# Patient Record
(demographics unavailable — no encounter records)

---

## 2025-06-02 NOTE — DISCUSSION/SUMMARY
[FreeTextEntry1] : Well appearing female is here for gyn exam; reports regular periods, has one male partner stopped using condoms consistently due to irritation during and after sex, recommend polyurethane condoms instead of latex and use of water-based lubrication as well. She declines BCM options at this time, also reviewed Phexxi, pt declines at this time.  Reviewed abnormal PAPs/HPV, recommend patient find out if she has had HPV vaccine, recommend Gardasil if she has not had it.  - PAP done with NG/GC - Rx for STI labs provided (pt has not had done in the past) - Healthy diet and routine exercise recommended - SBE advised - Safe sex/condom use/ barrier methods recommended 100%  RTO in 1 year for routine GYN exam and PRN

## 2025-06-02 NOTE — HISTORY OF PRESENT ILLNESS
[N] : Patient denies prior pregnancies [Currently Active] : currently active [Men] : men [Vaginal] : vaginal [Y] : Patient uses contraception [Condoms] : uses condoms [Monogamous (Male Partner)] : is monogamous with a male partner [FreeTextEntry1] : 27-year-old G0 Female is here as a new patient; previously seen at [facility] (Dr. Velez). Reports regular monthly periods. Sexually active with one male partner, uses withdrawal for contraception, has used condoms in the past but felt irritated with them. Hx of HPV a few years ago, unsure of her most recent PAP results.  [PapSmeardate] : 2022 [TextBox_31] : normal per pt [TextBox_102] : Age at menarche 19yr [LMPDate] : 05/22/25 [MensesFreq] : 25-77 [PGHxTotal] : 0

## 2025-06-02 NOTE — PHYSICAL EXAM
[Appropriately responsive] : appropriately responsive [Alert] : alert [No Acute Distress] : no acute distress [Regular Rate Rhythm] : regular rate rhythm [Soft] : soft [Non-tender] : non-tender [Non-distended] : non-distended [No HSM] : No HSM [No Lesions] : no lesions [No Mass] : no mass [Oriented x3] : oriented x3 [Labia Majora] : normal [Labia Minora] : normal [Normal] : normal [Uterine Adnexae] : normal [FreeTextEntry6] :  symmetric bilaterally, no palpable masses, no skin changes or dimpling, no nipple discharge, no adenopathy [FreeTextEntry5] : cervical ectopy noted, no CMT, PAP collected

## 2025-06-02 NOTE — HISTORY OF PRESENT ILLNESS
[N] : Patient denies prior pregnancies [Currently Active] : currently active [Men] : men [Vaginal] : vaginal [Y] : Patient uses contraception [Condoms] : uses condoms [Monogamous (Male Partner)] : is monogamous with a male partner [FreeTextEntry1] : 27-year-old G0 Female is here as a new patient; previously seen at [facility] by [facility] (Dr. Velez). Reports regular monthly periods. Sexually active with one male partner, uses withdrawal for contraception, has used condoms in the past but felt irritated with them. Hx of HPV a few years ago, unsure of her most recent PAP results.  [PapSmeardate] : 2022 [TextBox_31] : normal per pt [TextBox_102] : Age at menarche 19yr [LMPDate] : 05/22/25 [MensesFreq] : 25-39 [PGHxTotal] : 0